# Patient Record
Sex: FEMALE | Race: BLACK OR AFRICAN AMERICAN | NOT HISPANIC OR LATINO | URBAN - METROPOLITAN AREA
[De-identification: names, ages, dates, MRNs, and addresses within clinical notes are randomized per-mention and may not be internally consistent; named-entity substitution may affect disease eponyms.]

---

## 2018-09-10 ENCOUNTER — EMERGENCY (EMERGENCY)
Facility: HOSPITAL | Age: 38
LOS: 1 days | Discharge: HOME | End: 2018-09-10
Attending: EMERGENCY MEDICINE
Payer: COMMERCIAL

## 2018-09-10 VITALS — HEART RATE: 95 BPM | OXYGEN SATURATION: 99 % | RESPIRATION RATE: 16 BRPM

## 2018-09-10 VITALS
DIASTOLIC BLOOD PRESSURE: 90 MMHG | TEMPERATURE: 98 F | OXYGEN SATURATION: 98 % | SYSTOLIC BLOOD PRESSURE: 130 MMHG | RESPIRATION RATE: 18 BRPM | WEIGHT: 149.91 LBS | HEIGHT: 65 IN | HEART RATE: 110 BPM

## 2018-09-10 LAB
ALBUMIN SERPL ELPH-MCNC: 3.6 G/DL — SIGNIFICANT CHANGE UP (ref 3.3–5)
ALP SERPL-CCNC: 60 U/L — SIGNIFICANT CHANGE UP (ref 40–120)
ALT FLD-CCNC: 70 U/L — HIGH (ref 10–45)
ANION GAP SERPL CALC-SCNC: 13 MMOL/L — SIGNIFICANT CHANGE UP (ref 5–17)
APTT BLD: 29.6 SEC — SIGNIFICANT CHANGE UP (ref 27.5–37.4)
AST SERPL-CCNC: 41 U/L — HIGH (ref 10–40)
BASOPHILS # BLD AUTO: 0 K/UL — SIGNIFICANT CHANGE UP (ref 0–0.2)
BASOPHILS NFR BLD AUTO: 0.8 % — SIGNIFICANT CHANGE UP (ref 0–2)
BILIRUB SERPL-MCNC: 0.2 MG/DL — SIGNIFICANT CHANGE UP (ref 0.2–1.2)
BUN SERPL-MCNC: 8 MG/DL — SIGNIFICANT CHANGE UP (ref 7–23)
CALCIUM SERPL-MCNC: 9.3 MG/DL — SIGNIFICANT CHANGE UP (ref 8.4–10.5)
CHLORIDE SERPL-SCNC: 104 MMOL/L — SIGNIFICANT CHANGE UP (ref 96–108)
CO2 SERPL-SCNC: 24 MMOL/L — SIGNIFICANT CHANGE UP (ref 22–31)
CREAT SERPL-MCNC: 0.62 MG/DL — SIGNIFICANT CHANGE UP (ref 0.5–1.3)
EOSINOPHIL # BLD AUTO: 0 K/UL — SIGNIFICANT CHANGE UP (ref 0–0.5)
EOSINOPHIL NFR BLD AUTO: 0.6 % — SIGNIFICANT CHANGE UP (ref 0–6)
GLUCOSE SERPL-MCNC: 114 MG/DL — HIGH (ref 70–99)
HCG SERPL-ACNC: <2 MIU/ML — SIGNIFICANT CHANGE UP
HCT VFR BLD CALC: 29.1 % — LOW (ref 34.5–45)
HGB BLD-MCNC: 9.5 G/DL — LOW (ref 11.5–15.5)
INR BLD: 1.02 RATIO — SIGNIFICANT CHANGE UP (ref 0.88–1.16)
LYMPHOCYTES # BLD AUTO: 1.6 K/UL — SIGNIFICANT CHANGE UP (ref 1–3.3)
LYMPHOCYTES # BLD AUTO: 30.1 % — SIGNIFICANT CHANGE UP (ref 13–44)
MCHC RBC-ENTMCNC: 28 PG — SIGNIFICANT CHANGE UP (ref 27–34)
MCHC RBC-ENTMCNC: 32.7 GM/DL — SIGNIFICANT CHANGE UP (ref 32–36)
MCV RBC AUTO: 85.6 FL — SIGNIFICANT CHANGE UP (ref 80–100)
MONOCYTES # BLD AUTO: 0.3 K/UL — SIGNIFICANT CHANGE UP (ref 0–0.9)
MONOCYTES NFR BLD AUTO: 6.1 % — SIGNIFICANT CHANGE UP (ref 2–14)
NEUTROPHILS # BLD AUTO: 3.4 K/UL — SIGNIFICANT CHANGE UP (ref 1.8–7.4)
NEUTROPHILS NFR BLD AUTO: 62.4 % — SIGNIFICANT CHANGE UP (ref 43–77)
PLATELET # BLD AUTO: 500 K/UL — HIGH (ref 150–400)
POTASSIUM SERPL-MCNC: 4 MMOL/L — SIGNIFICANT CHANGE UP (ref 3.5–5.3)
POTASSIUM SERPL-SCNC: 4 MMOL/L — SIGNIFICANT CHANGE UP (ref 3.5–5.3)
PROT SERPL-MCNC: 6.8 G/DL — SIGNIFICANT CHANGE UP (ref 6–8.3)
PROTHROM AB SERPL-ACNC: 11.1 SEC — SIGNIFICANT CHANGE UP (ref 9.8–12.7)
RBC # BLD: 3.4 M/UL — LOW (ref 3.8–5.2)
RBC # FLD: 16.2 % — HIGH (ref 10.3–14.5)
SODIUM SERPL-SCNC: 141 MMOL/L — SIGNIFICANT CHANGE UP (ref 135–145)
WBC # BLD: 5.5 K/UL — SIGNIFICANT CHANGE UP (ref 3.8–10.5)
WBC # FLD AUTO: 5.5 K/UL — SIGNIFICANT CHANGE UP (ref 3.8–10.5)

## 2018-09-10 PROCEDURE — 99284 EMERGENCY DEPT VISIT MOD MDM: CPT | Mod: 25

## 2018-09-10 PROCEDURE — 85610 PROTHROMBIN TIME: CPT

## 2018-09-10 PROCEDURE — 85027 COMPLETE CBC AUTOMATED: CPT

## 2018-09-10 PROCEDURE — 93970 EXTREMITY STUDY: CPT | Mod: 26

## 2018-09-10 PROCEDURE — 80053 COMPREHEN METABOLIC PANEL: CPT

## 2018-09-10 PROCEDURE — 93970 EXTREMITY STUDY: CPT

## 2018-09-10 PROCEDURE — 85730 THROMBOPLASTIN TIME PARTIAL: CPT

## 2018-09-10 PROCEDURE — 84702 CHORIONIC GONADOTROPIN TEST: CPT

## 2018-09-10 PROCEDURE — 99284 EMERGENCY DEPT VISIT MOD MDM: CPT

## 2018-09-10 NOTE — ED ADULT NURSE NOTE - NSIMPLEMENTINTERV_GEN_ALL_ED
Implemented All Universal Safety Interventions:  [place] to call system. Call bell, personal items and telephone within reach. Instruct patient to call for assistance. Room bathroom lighting operational. Non-slip footwear when patient is off stretcher. Physically safe environment: no spills, clutter or unnecessary equipment. Stretcher in lowest position, wheels locked, appropriate side rails in place.

## 2018-09-10 NOTE — ED ADULT TRIAGE NOTE - CHIEF COMPLAINT QUOTE
"I have calf pain for two days (Left leg). My doctor told me to come to rule DVT" s/p hernia surgery august 27th in [facility] [place]

## 2018-09-10 NOTE — ED PROVIDER NOTE - PROGRESS NOTE DETAILS
Received sign out from resident Ali pending duplex read. Duplex negative. MD made aware and cleared pt for discharge with PMD f/u. Pt made aware of elevated LFTs and need to f/u with PMD for repeat. Stable for d/c. Strict return precautions enforced. - Praneeth Khan PA-C

## 2018-09-10 NOTE — ED PROVIDER NOTE - OBJECTIVE STATEMENT
37 yo female presenting with 1 week of worsening bilateral leg swelling with development of left calf pain in the last two days.  Patient describes pain as burning and sharp, worse with walking, completely resolved with rest.  has not taken anything for her symptoms.  symptoms started after patient got back from [place] on sept[place] 4.  recently had surgery in the end of august and still has drain in place.

## 2018-09-10 NOTE — ED PROVIDER NOTE - MEDICAL DECISION MAKING DETAILS
39 yo female presenting with leg swelling s/p travel and surgery; will obtain labs ultrasound --> re eval

## 2018-09-10 NOTE — ED PROVIDER NOTE - ATTENDING CONTRIBUTION TO CARE
39 y/o F with left calf pain and bilateraly LE swelling (L>R) with recent surgery (hernia repair) and recent travel from the [place] (returned 6 days ago).  On exam, is well appearing in NAD, but nervous that she has blood clot (was instructed by urgent care to come to ED right away for evaluation).  Denies chest pain, shortness of breath or palpitations.      Lungs CTABL.  Cardiac nrl s1s2 RRR no mgr (initial tachycardia resolved shortly after beginning my exam).  Abdomen soft nt/nd.  LE: mild nonpitting peripheral pedal and calf edema b/l.  Mild tenderness to left calf along posterior/lateral aspect; no erythema, no warmth, no palpable induration or fluctuance or crepitance.  No rash.  No break in skin along legs noted.  DP and PT pulses intact 2+ b/l.      No signs of infectious etiology.  No signs of traumatic injury or reports of trauma.  Given recent surgery and travel, raises concern for DVT.  Will check labs including cbc, cmp, pt/inr and obtain b/l [facility] duplex ultrasounds of the lower extremities.  If no significant lab abnormalities and no evidence of DVT, may reflect only venous stasis changed, though would caution patient to have early and close f/u and to return if worsening pain, or signs of infection develop (redness, warmth to area, drainage, fever or other systemic symptoms).    Tachycardia that was initially present unlikely to represent PE unless DVT found on US.

## 2018-09-10 NOTE — ED PROVIDER NOTE - CARE PLAN
Principal Discharge DX:	Pain of left calf  Assessment and plan of treatment:	1. Follow up with your PMD in 1-2 days. You liver function tests were mildly elevated here. Please follow up with your PMD for repeat testing.   2. Rest, stay hydrated. Continue all your current home medications as previously prescribed.   3. Return to the ED immediately if you develop any new/worsening symptoms, chest pain, shortness of breath, palpitations, weakness, dizziness, worsening lower extremity swelling, calf pain, fever/chills or any other concerning symptoms.

## 2018-09-10 NOTE — ED ADULT NURSE NOTE - CHIEF COMPLAINT QUOTE
"I have calf pain for two days (Left leg). My doctor told me to come to rule DVT" s/p hernia surgery august 27th in [facility] [place]

## 2018-09-10 NOTE — ED PROVIDER NOTE - NS ED ROS FT
Constitutional: no fever  Eyes: no conjunctivitis  Ears: no ear pain   Nose: no nasal congestion, Mouth/Throat: no throat pain, Neck: no stiffness  Cardiovascular: no chest pain  Chest: no cough  Gastrointestinal: no abdominal pain, no vomiting and diarrhea  MSK: +leg pain +swelling   : no dysuria  Skin: no rash  Neuro: no LOC

## 2018-09-10 NOTE — ED PROVIDER NOTE - PHYSICAL EXAMINATION
gen: well appearing  Mentation: AAO x 3  psych: mood appropriate  ENT: airway patent  Eyes: conjunctivae clear bilaterally  Cardio: tachycardic, no m/r/g  Resp: normal BS b/l  GI: s/nt/nd   Neuro: AAO x 3, sensation and motor function intact, CN 2-12 intact  Skin: No evidence of rash  MSK: normal movement of all extremities, 2+ pitting edema bilaterally in lower extremities, tenderness over left calf

## 2018-09-10 NOTE — ED PROVIDER NOTE - PLAN OF CARE
1. Follow up with your PMD in 1-2 days. You liver function tests were mildly elevated here. Please follow up with your PMD for repeat testing.   2. Rest, stay hydrated. Continue all your current home medications as previously prescribed.   3. Return to the ED immediately if you develop any new/worsening symptoms, chest pain, shortness of breath, palpitations, weakness, dizziness, worsening lower extremity swelling, calf pain, fever/chills or any other concerning symptoms.

## 2019-01-22 ENCOUNTER — EMERGENCY (EMERGENCY)
Facility: HOSPITAL | Age: 39
LOS: 1 days | Discharge: HOME | End: 2019-01-22
Attending: EMERGENCY MEDICINE | Admitting: EMERGENCY MEDICINE
Payer: COMMERCIAL

## 2019-01-22 VITALS
RESPIRATION RATE: 18 BRPM | DIASTOLIC BLOOD PRESSURE: 98 MMHG | TEMPERATURE: 99 F | HEART RATE: 105 BPM | OXYGEN SATURATION: 100 % | SYSTOLIC BLOOD PRESSURE: 171 MMHG

## 2019-01-22 DIAGNOSIS — Z90.89 ACQUIRED ABSENCE OF OTHER ORGANS: Chronic | ICD-10-CM

## 2019-01-22 LAB
ANION GAP SERPL CALC-SCNC: 15 MMO/L — HIGH (ref 7–14)
BASOPHILS # BLD AUTO: 0.01 K/UL — SIGNIFICANT CHANGE UP (ref 0–0.2)
BASOPHILS NFR BLD AUTO: 0.1 % — SIGNIFICANT CHANGE UP (ref 0–2)
BUN SERPL-MCNC: 9 MG/DL — SIGNIFICANT CHANGE UP (ref 7–23)
CALCIUM SERPL-MCNC: 9.4 MG/DL — SIGNIFICANT CHANGE UP (ref 8.4–10.5)
CHLORIDE SERPL-SCNC: 100 MMOL/L — SIGNIFICANT CHANGE UP (ref 98–107)
CO2 SERPL-SCNC: 22 MMOL/L — SIGNIFICANT CHANGE UP (ref 22–31)
CREAT SERPL-MCNC: 0.68 MG/DL — SIGNIFICANT CHANGE UP (ref 0.5–1.3)
EOSINOPHIL # BLD AUTO: 0.01 K/UL — SIGNIFICANT CHANGE UP (ref 0–0.5)
EOSINOPHIL NFR BLD AUTO: 0.1 % — SIGNIFICANT CHANGE UP (ref 0–6)
FLU A RESULT: DETECTED — HIGH
FLU A RESULT: DETECTED — HIGH
FLUAV AG NPH QL: DETECTED — HIGH
FLUBV AG NPH QL: NOT DETECTED — SIGNIFICANT CHANGE UP
GLUCOSE SERPL-MCNC: 106 MG/DL — HIGH (ref 70–99)
HCT VFR BLD CALC: 41.4 % — SIGNIFICANT CHANGE UP (ref 34.5–45)
HGB BLD-MCNC: 12.7 G/DL — SIGNIFICANT CHANGE UP (ref 11.5–15.5)
IMM GRANULOCYTES NFR BLD AUTO: 0.6 % — SIGNIFICANT CHANGE UP (ref 0–1.5)
LYMPHOCYTES # BLD AUTO: 0.94 K/UL — LOW (ref 1–3.3)
LYMPHOCYTES # BLD AUTO: 13.3 % — SIGNIFICANT CHANGE UP (ref 13–44)
MCHC RBC-ENTMCNC: 24.2 PG — LOW (ref 27–34)
MCHC RBC-ENTMCNC: 30.7 % — LOW (ref 32–36)
MCV RBC AUTO: 79 FL — LOW (ref 80–100)
MONOCYTES # BLD AUTO: 0.54 K/UL — SIGNIFICANT CHANGE UP (ref 0–0.9)
MONOCYTES NFR BLD AUTO: 7.6 % — SIGNIFICANT CHANGE UP (ref 2–14)
NEUTROPHILS # BLD AUTO: 5.54 K/UL — SIGNIFICANT CHANGE UP (ref 1.8–7.4)
NEUTROPHILS NFR BLD AUTO: 78.3 % — HIGH (ref 43–77)
NRBC # FLD: 0 K/UL — LOW (ref 25–125)
PLATELET # BLD AUTO: 281 K/UL — SIGNIFICANT CHANGE UP (ref 150–400)
PMV BLD: 11.2 FL — SIGNIFICANT CHANGE UP (ref 7–13)
POTASSIUM SERPL-MCNC: 4.3 MMOL/L — SIGNIFICANT CHANGE UP (ref 3.5–5.3)
POTASSIUM SERPL-SCNC: 4.3 MMOL/L — SIGNIFICANT CHANGE UP (ref 3.5–5.3)
RBC # BLD: 5.24 M/UL — HIGH (ref 3.8–5.2)
RBC # FLD: 16.8 % — HIGH (ref 10.3–14.5)
RSV RESULT: SIGNIFICANT CHANGE UP
RSV RNA RESP QL NAA+PROBE: SIGNIFICANT CHANGE UP
SODIUM SERPL-SCNC: 137 MMOL/L — SIGNIFICANT CHANGE UP (ref 135–145)
WBC # BLD: 7.08 K/UL — SIGNIFICANT CHANGE UP (ref 3.8–10.5)
WBC # FLD AUTO: 7.08 K/UL — SIGNIFICANT CHANGE UP (ref 3.8–10.5)

## 2019-01-22 PROCEDURE — 71046 X-RAY EXAM CHEST 2 VIEWS: CPT | Mod: 26

## 2019-01-22 PROCEDURE — 99284 EMERGENCY DEPT VISIT MOD MDM: CPT

## 2019-01-22 RX ORDER — ACETAMINOPHEN 500 MG
650 TABLET ORAL ONCE
Qty: 0 | Refills: 0 | Status: COMPLETED | OUTPATIENT
Start: 2019-01-22 | End: 2019-01-22

## 2019-01-22 RX ORDER — SODIUM CHLORIDE 9 MG/ML
2000 INJECTION INTRAMUSCULAR; INTRAVENOUS; SUBCUTANEOUS ONCE
Qty: 0 | Refills: 0 | Status: COMPLETED | OUTPATIENT
Start: 2019-01-22 | End: 2019-01-22

## 2019-01-22 RX ORDER — KETOROLAC TROMETHAMINE 30 MG/ML
15 SYRINGE (ML) INJECTION ONCE
Qty: 0 | Refills: 0 | Status: DISCONTINUED | OUTPATIENT
Start: 2019-01-22 | End: 2019-01-22

## 2019-01-22 RX ORDER — IBUPROFEN 200 MG
1 TABLET ORAL
Qty: 30 | Refills: 0 | OUTPATIENT
Start: 2019-01-22

## 2019-01-22 RX ADMIN — Medication 650 MILLIGRAM(S): at 09:55

## 2019-01-22 RX ADMIN — SODIUM CHLORIDE 2000 MILLILITER(S): 9 INJECTION INTRAMUSCULAR; INTRAVENOUS; SUBCUTANEOUS at 09:56

## 2019-01-22 RX ADMIN — Medication 15 MILLIGRAM(S): at 09:55

## 2019-01-22 NOTE — ED ADULT TRIAGE NOTE - PAIN RATING/NUMBER SCALE (0-10): ACTIVITY
"Chief Complaint   Patient presents with     RECHECK     right foot fx MRI done last week        Initial /76  Ht 5' 5\" (1.651 m)  Wt 140 lb (63.5 kg)  LMP 08/20/2015  BMI 23.3 kg/m2 Estimated body mass index is 23.3 kg/(m^2) as calculated from the following:    Height as of this encounter: 5' 5\" (1.651 m).    Weight as of this encounter: 140 lb (63.5 kg).  Medication Reconciliation: complete   Emmanuel Lugo MA      "
5

## 2019-01-22 NOTE — ED PROVIDER NOTE - OBJECTIVE STATEMENT
37 y/o female with a PMHx of tonsillectomy presents to the ED c/o chills, myalgias, and cough since yesterday afternoon. 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 Received flu vaccination this year. No other acute complaints at time of eval.

## 2019-01-22 NOTE — ED PROVIDER NOTE - PLAN OF CARE
You were seen today for your cough.  You have influenza, "the flu".  Antibiotics are not helpful in this case.  Take acetaminophen for any pain or fever.  Drink plenty of fluids.  Wash your hands well!  Be sure to follow up with your primary care physician in 2-3 days for re-evaluation.  RETURN TO THE EMERGENCY DEPARTMENT IMMEDIATELY FOR SEVERE WEAKNESS, TROUBLE BREATHING, IF YOU CANNOT EAT OR DRINK, OR FOR ANY OTHER CONCERN.

## 2019-01-22 NOTE — ED PROVIDER NOTE - PMH
Complete Spontaneous     Fibroids    GBS (Group B Streptococcus) Infection    GDM, Class A2    History of LEEP (Loop Electrosurgical Excision Procedure) of Cervix Complicating Pregn    History of Tonsillectomy    PPD Positive  negative chest x-ray  TOP (Termination of Pregnancy)    TOP (Termination of Pregnancy)

## 2019-01-22 NOTE — ED PROVIDER NOTE - MEDICAL DECISION MAKING DETAILS
37 y/o female with flu-like symptoms. Obtain CBC, BNP, urine pregnancy, CXR, give 2L IV fluid, Toradol, aceto, and reassess. 37 y/o female with flu-like symptoms. Obtain CBC, BMP, urine pregnancy, CXR, give 2L IV fluid, Toradol, aceto, and reassess.

## 2019-01-22 NOTE — ED PROVIDER NOTE - CARE PLAN
Principal Discharge DX:	Flu-like symptoms  Assessment and plan of treatment:	You were seen today for your cough.  You have influenza, "the flu".  Antibiotics are not helpful in this case.  Take acetaminophen for any pain or fever.  Drink plenty of fluids.  Wash your hands well!  Be sure to follow up with your primary care physician in 2-3 days for re-evaluation.  RETURN TO THE EMERGENCY DEPARTMENT IMMEDIATELY FOR SEVERE WEAKNESS, TROUBLE BREATHING, IF YOU CANNOT EAT OR DRINK, OR FOR ANY OTHER CONCERN. Principal Discharge DX:	Flu  Assessment and plan of treatment:	You were seen today for your cough.  You have influenza, "the flu".  Antibiotics are not helpful in this case.  Take acetaminophen for any pain or fever.  Drink plenty of fluids.  Wash your hands well!  Be sure to follow up with your primary care physician in 2-3 days for re-evaluation.  RETURN TO THE EMERGENCY DEPARTMENT IMMEDIATELY FOR SEVERE WEAKNESS, TROUBLE BREATHING, IF YOU CANNOT EAT OR DRINK, OR FOR ANY OTHER CONCERN.

## 2019-02-18 ENCOUNTER — TRANSCRIPTION ENCOUNTER (OUTPATIENT)
Age: 39
End: 2019-02-18

## 2019-03-15 ENCOUNTER — TRANSCRIPTION ENCOUNTER (OUTPATIENT)
Age: 39
End: 2019-03-15

## 2019-06-27 ENCOUNTER — EMERGENCY (EMERGENCY)
Facility: HOSPITAL | Age: 39
LOS: 1 days | Discharge: HOME | End: 2019-06-27
Attending: EMERGENCY MEDICINE
Payer: COMMERCIAL

## 2019-06-27 VITALS
TEMPERATURE: 98 F | DIASTOLIC BLOOD PRESSURE: 88 MMHG | HEART RATE: 90 BPM | RESPIRATION RATE: 18 BRPM | WEIGHT: 164.91 LBS | HEIGHT: 65 IN | SYSTOLIC BLOOD PRESSURE: 153 MMHG | OXYGEN SATURATION: 100 %

## 2019-06-27 DIAGNOSIS — Z90.89 ACQUIRED ABSENCE OF OTHER ORGANS: Chronic | ICD-10-CM

## 2019-06-27 PROCEDURE — 99283 EMERGENCY DEPT VISIT LOW MDM: CPT | Mod: 25

## 2019-06-28 VITALS
OXYGEN SATURATION: 98 % | DIASTOLIC BLOOD PRESSURE: 105 MMHG | HEART RATE: 85 BPM | RESPIRATION RATE: 16 BRPM | TEMPERATURE: 98 F | SYSTOLIC BLOOD PRESSURE: 139 MMHG

## 2019-06-28 PROCEDURE — 99283 EMERGENCY DEPT VISIT LOW MDM: CPT

## 2019-06-28 RX ORDER — ACETAMINOPHEN 500 MG
975 TABLET ORAL ONCE
Refills: 0 | Status: COMPLETED | OUTPATIENT
Start: 2019-06-28 | End: 2019-06-28

## 2019-06-28 RX ORDER — IBUPROFEN 200 MG
600 TABLET ORAL ONCE
Refills: 0 | Status: COMPLETED | OUTPATIENT
Start: 2019-06-28 | End: 2019-06-28

## 2019-06-28 RX ADMIN — Medication 975 MILLIGRAM(S): at 00:36

## 2019-06-28 RX ADMIN — Medication 600 MILLIGRAM(S): at 00:36

## 2019-06-28 NOTE — ED PROVIDER NOTE - NS ED ROS FT
CONST: no fevers, no chills  EYES: no pain, no vision changes  ENT: no sore throat, no ear pain, no change in hearing  CV: no chest pain, no leg swelling  RESP: no shortness of breath, no cough  ABD: no abdominal pain, no nausea, no vomiting, no diarrhea  : no dysuria, no flank pain, no hematuria  MSK: no back pain, no extremity pain  NEURO: +headache, no additional neurologic complaints  HEME: no easy bleeding  SKIN:  no rash CONST: no fevers, no chills  EYES: no pain, no vision changes  ENT: no sore throat, no ear pain, no change in hearing  CV: no chest pain, no leg swelling  RESP: no shortness of breath, no cough  ABD: no abdominal pain, no nausea, no vomiting, no diarrhea  : no dysuria, no flank pain, no hematuria  MSK: no back pain, no extremity pain  NEURO: +headache, no additional neurologic complaints  HEME: no easy bleeding  SKIN:  no rash    All other systems negative

## 2019-06-28 NOTE — ED ADULT NURSE NOTE - OBJECTIVE STATEMENT
39 YOF A&Ox3 with pmh of DM (on metformin) presents to ED for high BP of 1 day. Patient states felt a headache that radiates to back of eyes when she decided to check her BP which was in 150s. Patient states has never been diagnosed with HTN. Patient rates headache 7/10. Patient denies sob, chest pain, n/v/d, dizziness, blurry vision at this time. patient states took home medication today. safety maintained.

## 2019-06-28 NOTE — ED PROVIDER NOTE - NS ED ATTENDING STATEMENT MOD
I have personally seen and examined this patient.  I have fully participated in the care of this patient. I have reviewed all pertinent clinical information, including history, physical exam, plan and the Resident’s note and agree except as noted.
Yes

## 2019-06-28 NOTE — ED PROVIDER NOTE - PHYSICAL EXAMINATION
Gloria Schmidt, .:   GENERAL: Patient awake alert NAD.  HEENT: NC/AT, Moist mucous membranes, PERRL, EOMI.  LUNGS: CTAB, no wheezes or crackles.   CARDIAC: RRR, no m/r/g.    ABDOMEN: Soft, NT, ND, No rebound, guarding. No CVA tenderness.   EXT: No edema. No calf tenderness.  MSK: No spinal tenderness, no pain with movement, no deformities.  NEURO: A&Ox3. Moving all extremities. CN2-12 grossly intact, no pronator drift, normal sensation and 5/5 strength in all extremities, normal gait.  SKIN: Warm and dry. No rash.  PSYCH: Normal affect.

## 2019-06-28 NOTE — ED PROVIDER NOTE - ATTENDING CONTRIBUTION TO CARE
Ellyn Medina MD - Attending Physician: I have personally seen and examined this patient with the resident/fellow.  I have fully participated in the care of this patient. I have reviewed all pertinent clinical information, including history, physical exam, plan and the Resident/Fellow’s note and agree except as noted. See MDM

## 2019-06-28 NOTE — ED ADULT NURSE NOTE - CHPI ED NUR SYMPTOMS NEG
no decreased eating/drinking/no chills/no fever/no dizziness/no pain/no nausea/no weakness/no vomiting/no tingling

## 2019-06-28 NOTE — ED PROVIDER NOTE - CLINICAL SUMMARY MEDICAL DECISION MAKING FREE TEXT BOX
39F p/w elevated BP. No signs of HTN urgency, no neuro deficits. Will pain control HA, reassess. dc home with PMD f/u. 39F p/w elevated BP. No signs of HTN urgency, no neuro deficits. Will pain control HA, reassess. dc home with PMD f/u.    Ellyn Medina MD - Attending Physician: Pt here with sporadic headache for the last 2 weeks. Noted elevated BP during headaches. No neuro deficits. Well appearing. Meds, f/u with PMD

## 2019-06-28 NOTE — ED PROVIDER NOTE - OBJECTIVE STATEMENT
39F pmhx of DM (well controlled) presents with elevated BP at home, 150s/100s. Noted to have bitemporal headache and neck pain. 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 Did not take any medications for headache. Denies any chest pain, SOB, N/V/D, vision change.

## 2019-06-28 NOTE — ED PROVIDER NOTE - NSFOLLOWUPINSTRUCTIONS_ED_ALL_ED_FT
Preventing Hypertension  Hypertension, commonly called high blood pressure, is when the force of blood pumping through the arteries is too strong. Arteries are blood vessels that carry blood from the heart throughout the body. Over time, hypertension can damage the arteries and decrease blood flow to important parts of the body, including the brain, heart, and kidneys. Often, hypertension does not cause symptoms until blood pressure is very high. For this reason, it is important to have your blood pressure checked on a regular basis.    Hypertension can often be prevented with diet and lifestyle changes. If you already have hypertension, you can control it with diet and lifestyle changes, as well as medicine.    What nutrition changes can be made?  ImageMaintain a healthy diet. This includes:  Eating less salt (sodium). Ask your health care provider how much sodium is safe for you to have. The general recommendation is to consume less than 1 tsp (2,300 mg) of sodium a day.  Do not add salt to your food.  Choose low-sodium options when grocery shopping and eating out.  Limiting fats in your diet. You can do this by eating low-fat or fat-free dairy products and by eating less red meat.  Eating more fruits, vegetables, and whole grains. Make a goal to eat:  1½–2 cups of fresh fruits and vegetables each day.  3–4 servings of whole grains each day.  Avoiding foods and beverages that have added sugars.  Eating fish that contain healthy fats (omega-3 fatty acids), such as mackerel or salmon.  If you need help putting together a healthy eating plan, try the DASH diet. This diet is high in fruits, vegetables, and whole grains. It is low in sodium, red meat, and added sugars. DASH stands for Dietary Approaches to Stop Hypertension.    What lifestyle changes can be made?  Image   Lose weight if you are overweight. Losing just 3?5% of your body weight can help prevent or control hypertension.  For example, if your present weight is 200 lb (91 kg), a loss of 3–5% of your weight means losing 6–10 lb (2.7–4.5 kg).  Ask your health care provider to help you with a diet and exercise plan to safely lose weight.  Get enough exercise. Do at least 150 minutes of moderate-intensity exercise each week.  You could do this in short exercise sessions several times a day, or you could do longer exercise sessions a few times a week. For example, you could take a brisk 10-minute walk or bike ride, 3 times a day, for 5 days a week.  Find ways to reduce stress, such as exercising, meditating, listening to music, or taking a yoga class. If you need help reducing stress, ask your health care provider.  Do not smoke. This includes e-cigarettes. Chemicals in tobacco and nicotine products raise your blood pressure each time you smoke. If you need help quitting, ask your health care provider.  Avoid alcohol. If you drink alcohol, limit alcohol intake to no more than 1 drink a day for nonpregnant women and 2 drinks a day for men. One drink equals 12 oz of beer, 5 oz of wine, or 1½ oz of hard liquor.  Why are these changes important?  Diet and lifestyle changes can help you prevent hypertension, and they may make you feel better overall and improve your quality of life. If you have hypertension, making these changes will help you control it and help prevent major complications, such as:  Hardening and narrowing of arteries that supply blood to:  Your heart. This can cause a heart attack.  Your brain. This can cause a stroke.  Your kidneys. This can cause kidney failure.  Stress on your heart muscle, which can cause heart failure.  What can I do to lower my risk?  Work with your health care provider to make a hypertension prevention plan that works for you. Follow your plan and keep all follow-up visits as told by your health care provider.  Learn how to check your blood pressure at home. Make sure that you know your personal target blood pressure, as told by your health care provider.  How is this treated?  In addition to diet and lifestyle changes, your health care provider may recommend medicines to help lower your blood pressure. You may need to try a few different medicines to find what works best for you. You also may need to take more than one medicine. Take over-the-counter and prescription medicines only as told by your health care provider.    Where to find support  Your health care provider can help you prevent hypertension and help you keep your blood pressure at a healthy level. Your local hospital or your community may also provide support services and prevention programs.    The American Heart Association offers an online support network at: http://supportnetwork.heart.org/high-blood-pressure    Where to find more information  Learn more about hypertension from:  National Heart, Lung, and Blood [place]: www.nhlbi.nih.gov/health/health-topics/topics/hbp  Centers for Disease Control and Prevention: www.cdc.gov/bloodpressure  American Academy of Family Physicians: http://familydoctor.org/familydoctor/en/diseases-conditions/high-blood-pressure.printerview.all.html  Learn more about the DASH diet from:  National Heart, Lung, and Blood [place]: www.nhlbi.nih.gov/health/health-topics/topics/dash  Contact a health care provider if:  You think you are having a reaction to medicines you have taken.  You have recurrent headaches or feel dizzy.  You have swelling in your ankles.  You have trouble with your vision.  Summary  Hypertension often does not cause any symptoms until blood pressure is very high. It is important to get your blood pressure checked regularly.  Diet and lifestyle changes are the most important steps in preventing hypertension.  By keeping your blood pressure in a healthy range, you can prevent complications like heart attack, heart failure, stroke, and kidney failure.  Work with your health care provider to make a hypertension prevention plan that works for you.  This information is not intended to replace advice given to you by your health care provider. Make sure you discuss any questions you have with your health care provider.

## 2020-02-25 ENCOUNTER — APPOINTMENT (OUTPATIENT)
Dept: GASTROENTEROLOGY | Facility: CLINIC | Age: 40
End: 2020-02-25
Payer: COMMERCIAL

## 2020-02-25 VITALS
WEIGHT: 156 LBS | HEIGHT: 66 IN | DIASTOLIC BLOOD PRESSURE: 80 MMHG | SYSTOLIC BLOOD PRESSURE: 130 MMHG | TEMPERATURE: 98.7 F | BODY MASS INDEX: 25.07 KG/M2 | OXYGEN SATURATION: 98 % | HEART RATE: 88 BPM

## 2020-02-25 DIAGNOSIS — Z72.89 OTHER PROBLEMS RELATED TO LIFESTYLE: ICD-10-CM

## 2020-02-25 DIAGNOSIS — Z78.9 OTHER SPECIFIED HEALTH STATUS: ICD-10-CM

## 2020-02-25 DIAGNOSIS — Z83.3 FAMILY HISTORY OF DIABETES MELLITUS: ICD-10-CM

## 2020-02-25 DIAGNOSIS — Z82.49 FAMILY HISTORY OF ISCHEMIC HEART DISEASE AND OTHER DISEASES OF THE CIRCULATORY SYSTEM: ICD-10-CM

## 2020-02-25 DIAGNOSIS — Z86.39 PERSONAL HISTORY OF OTHER ENDOCRINE, NUTRITIONAL AND METABOLIC DISEASE: ICD-10-CM

## 2020-02-25 DIAGNOSIS — Z86.79 PERSONAL HISTORY OF OTHER DISEASES OF THE CIRCULATORY SYSTEM: ICD-10-CM

## 2020-02-25 PROCEDURE — 99243 OFF/OP CNSLTJ NEW/EST LOW 30: CPT

## 2020-02-25 RX ORDER — METFORMIN HYDROCHLORIDE 625 MG/1
TABLET ORAL
Refills: 0 | Status: ACTIVE | COMMUNITY

## 2020-02-25 RX ORDER — LOSARTAN POTASSIUM 100 MG/1
TABLET, FILM COATED ORAL
Refills: 0 | Status: ACTIVE | COMMUNITY

## 2020-02-25 NOTE — CONSULT LETTER
[Consult Letter:] : I had the pleasure of evaluating your patient, [unfilled]. [Dear  ___] : Dear  [unfilled], [Consult Closing:] : Thank you very much for allowing me to participate in the care of this patient.  If you have any questions, please do not hesitate to contact me. [Please see my note below.] : Please see my note below. [Sincerely,] : Sincerely, [FreeTextEntry3] : Jason López MD, FACG\par

## 2020-02-25 NOTE — PHYSICAL EXAM
[General Appearance - Alert] : alert [General Appearance - In No Acute Distress] : in no acute distress [Sclera] : the sclera and conjunctiva were normal [PERRL With Normal Accommodation] : pupils were equal in size, round, and reactive to light [Extraocular Movements] : extraocular movements were intact [Oropharynx] : the oropharynx was normal [Outer Ear] : the ears and nose were normal in appearance [Neck Appearance] : the appearance of the neck was normal [Neck Cervical Mass (___cm)] : no neck mass was observed [Jugular Venous Distention Increased] : there was no jugular-venous distention [Thyroid Diffuse Enlargement] : the thyroid was not enlarged [Thyroid Nodule] : there were no palpable thyroid nodules [Auscultation Breath Sounds / Voice Sounds] : lungs were clear to auscultation bilaterally [Heart Rate And Rhythm] : heart rate was normal and rhythm regular [Heart Sounds] : normal S1 and S2 [Heart Sounds Gallop] : no gallops [Murmurs] : no murmurs [Heart Sounds Pericardial Friction Rub] : no pericardial rub [Bowel Sounds] : normal bowel sounds [Abdomen Soft] : soft [] : no hepato-splenomegaly [Abdomen Tenderness] : non-tender [Cervical Lymph Nodes Enlarged Posterior Bilaterally] : posterior cervical [Abdomen Mass (___ Cm)] : no abdominal mass palpated [Cervical Lymph Nodes Enlarged Anterior Bilaterally] : anterior cervical [Supraclavicular Lymph Nodes Enlarged Bilaterally] : supraclavicular [No CVA Tenderness] : no ~M costovertebral angle tenderness [No Spinal Tenderness] : no spinal tenderness [Abnormal Walk] : normal gait [Nail Clubbing] : no clubbing  or cyanosis of the fingernails [Musculoskeletal - Swelling] : no joint swelling seen [Motor Tone] : muscle strength and tone were normal [Sensation] : the sensory exam was normal to light touch and pinprick [Deep Tendon Reflexes (DTR)] : deep tendon reflexes were 2+ and symmetric [No Focal Deficits] : no focal deficits [Impaired Insight] : insight and judgment were intact [Affect] : the affect was normal [Oriented To Time, Place, And Person] : oriented to person, place, and time

## 2020-02-25 NOTE — HISTORY OF PRESENT ILLNESS
[Heartburn] : improved heartburn [Nausea] : denies nausea [Vomiting] : denies vomiting [Yellow Skin Or Eyes (Jaundice)] : denies jaundice [Abdominal Pain] : denies abdominal pain [Abdominal Swelling] : denies abdominal swelling [Rectal Pain] : denies rectal pain [Wt Gain ___ Lbs] : recent [unfilled] ~Upound(s) weight gain [Diarrhea] : diarrhea [Constipation] : constipation [Wt Loss ___ Lbs] : no recent weight loss [GERD] : no gastroesophageal reflux disease [Peptic Ulcer Disease] : no peptic ulcer disease [Hiatus Hernia] : no hiatus hernia [Pancreatitis] : no pancreatitis [Cholelithiasis] : no cholelithiasis [Kidney Stone] : no kidney stone [Inflammatory Bowel Disease] : no inflammatory bowel disease [Irritable Bowel Syndrome] : no irritable bowel syndrome [Diverticulitis] : no diverticulitis [Alcohol Abuse] : no alcohol abuse [Malignancy] : no malignancy [Abdominal Surgery] : no abdominal surgery [Appendectomy] : no appendectomy [Cholecystectomy] : no cholecystectomy [de-identified] : 39 year old woman has noticed a  change in her bowel movements for the past 3 - 4 months. She has constipation followed by soft n long bowel movements. She denies rectal bleeding, melena or hematemesis. She had been on high fiber diet but her PCP recommended eliminating the fiber supplements. Her only new medication is Losartan. She has never underdone a colonoscopy.. she is being treated for HTN and NIDDM.,

## 2020-04-06 ENCOUNTER — APPOINTMENT (OUTPATIENT)
Dept: GASTROENTEROLOGY | Facility: CLINIC | Age: 40
End: 2020-04-06
Payer: COMMERCIAL

## 2020-04-06 PROCEDURE — 99213 OFFICE O/P EST LOW 20 MIN: CPT

## 2020-04-06 NOTE — HISTORY OF PRESENT ILLNESS
[Medical Office: ([facility])___] : at ~his/her~ medical office located in [place] [Patient] : the patient [Self] : self [FreeTextEntry2] : Verbal consent given on     4/6           at    11:00         by     Rizwana Azevedo                  [de-identified] : 39 year old woman complains of constipation. She has been taking Dulcolax but is concerned about taking it. She denies rectal bleeding. She is due for a colonoscopy.

## 2020-04-06 NOTE — ASSESSMENT
[FreeTextEntry1] : Suggested she start on a high fiber diet and start Benefiber and MiraLAX. She will undergo a colonoscopy as soon as it is available.

## 2020-04-22 ENCOUNTER — APPOINTMENT (OUTPATIENT)
Dept: GASTROENTEROLOGY | Facility: AMBULATORY MEDICAL SERVICES | Age: 40
End: 2020-04-22

## 2020-07-11 DIAGNOSIS — Z01.818 ENCOUNTER FOR OTHER PREPROCEDURAL EXAMINATION: ICD-10-CM

## 2020-07-12 ENCOUNTER — APPOINTMENT (OUTPATIENT)
Dept: DISASTER EMERGENCY | Facility: CLINIC | Age: 40
End: 2020-07-12

## 2020-07-14 LAB — SARS-COV-2 N GENE NPH QL NAA+PROBE: NOT DETECTED

## 2020-07-15 ENCOUNTER — APPOINTMENT (OUTPATIENT)
Dept: GASTROENTEROLOGY | Facility: AMBULATORY MEDICAL SERVICES | Age: 40
End: 2020-07-15
Payer: COMMERCIAL

## 2020-07-15 PROCEDURE — 45378 DIAGNOSTIC COLONOSCOPY: CPT

## 2020-07-30 ENCOUNTER — APPOINTMENT (OUTPATIENT)
Dept: GASTROENTEROLOGY | Facility: CLINIC | Age: 40
End: 2020-07-30
Payer: COMMERCIAL

## 2020-07-30 VITALS
TEMPERATURE: 98.5 F | WEIGHT: 166 LBS | BODY MASS INDEX: 26.68 KG/M2 | OXYGEN SATURATION: 99 % | SYSTOLIC BLOOD PRESSURE: 115 MMHG | HEART RATE: 93 BPM | HEIGHT: 66 IN | RESPIRATION RATE: 17 BRPM | DIASTOLIC BLOOD PRESSURE: 80 MMHG

## 2020-07-30 DIAGNOSIS — R19.4 CHANGE IN BOWEL HABIT: ICD-10-CM

## 2020-07-30 PROCEDURE — 99213 OFFICE O/P EST LOW 20 MIN: CPT

## 2020-07-30 RX ORDER — POLYETHYLENE GLYCOL 3350, SODIUM CHLORIDE, SODIUM BICARBONATE AND POTASSIUM CHLORIDE WITH LEMON FLAVOR 420; 11.2; 5.72; 1.48 G/4L; G/4L; G/4L; G/4L
420 POWDER, FOR SOLUTION ORAL
Qty: 1 | Refills: 0 | Status: COMPLETED | COMMUNITY
Start: 2020-02-25 | End: 2020-07-30

## 2020-07-30 RX ORDER — LINACLOTIDE 290 UG/1
290 CAPSULE, GELATIN COATED ORAL
Qty: 30 | Refills: 2 | Status: DISCONTINUED | COMMUNITY
Start: 2020-07-30 | End: 2020-07-30

## 2020-07-30 NOTE — PHYSICAL EXAM
[General Appearance - Alert] : alert [General Appearance - In No Acute Distress] : in no acute distress [Sclera] : the sclera and conjunctiva were normal [PERRL With Normal Accommodation] : pupils were equal in size, round, and reactive to light [Extraocular Movements] : extraocular movements were intact [Outer Ear] : the ears and nose were normal in appearance [Oropharynx] : the oropharynx was normal [Neck Appearance] : the appearance of the neck was normal [Neck Cervical Mass (___cm)] : no neck mass was observed [Jugular Venous Distention Increased] : there was no jugular-venous distention [Thyroid Diffuse Enlargement] : the thyroid was not enlarged [Thyroid Nodule] : there were no palpable thyroid nodules [Auscultation Breath Sounds / Voice Sounds] : lungs were clear to auscultation bilaterally [Heart Rate And Rhythm] : heart rate was normal and rhythm regular [Heart Sounds] : normal S1 and S2 [Heart Sounds Gallop] : no gallops [Murmurs] : no murmurs [Heart Sounds Pericardial Friction Rub] : no pericardial rub [Bowel Sounds] : normal bowel sounds [Abdomen Soft] : soft [Abdomen Tenderness] : non-tender [] : no hepato-splenomegaly [Abdomen Mass (___ Cm)] : no abdominal mass palpated [Cervical Lymph Nodes Enlarged Posterior Bilaterally] : posterior cervical [Cervical Lymph Nodes Enlarged Anterior Bilaterally] : anterior cervical [Supraclavicular Lymph Nodes Enlarged Bilaterally] : supraclavicular [No CVA Tenderness] : no ~M costovertebral angle tenderness [No Spinal Tenderness] : no spinal tenderness

## 2020-07-30 NOTE — HISTORY OF PRESENT ILLNESS
[de-identified] : 40 year old woman with chronic constipation. She takes a fiber supplement but remains constipated. Colonoscopy on 7/15 was unremarkable. She denies rectal bleeding, melena or hematemesis.

## 2020-08-24 NOTE — ED ADULT NURSE NOTE - NSSUSCREENINGQ2_ED_ALL_ED
Full range of motion of upper and lower extremities, no joint tenderness/swelling. No

## 2021-01-11 NOTE — ED PROVIDER NOTE - NS_EDPROVIDERDISPOUSERTYPE_ED_A_ED
Your opinion matters!  Thank you for choosing the [facility].  You might receive a survey in the mail about your experience today to evaluate our clinic.  Please fill it out and return it in the pre-paid envelope.  It was a pleasure to care for you today!    If you any questions regarding your visit today or your discharge instructions call [phone].    
Attending Attestation (For Attendings USE Only)...

## 2021-11-01 ENCOUNTER — APPOINTMENT (OUTPATIENT)
Dept: GASTROENTEROLOGY | Facility: CLINIC | Age: 41
End: 2021-11-01
Payer: COMMERCIAL

## 2021-11-01 VITALS
HEIGHT: 66 IN | BODY MASS INDEX: 25.55 KG/M2 | OXYGEN SATURATION: 100 % | SYSTOLIC BLOOD PRESSURE: 143 MMHG | TEMPERATURE: 97.6 F | HEART RATE: 77 BPM | DIASTOLIC BLOOD PRESSURE: 89 MMHG | RESPIRATION RATE: 17 BRPM | WEIGHT: 159 LBS

## 2021-11-01 DIAGNOSIS — K59.00 CONSTIPATION, UNSPECIFIED: ICD-10-CM

## 2021-11-01 DIAGNOSIS — Z80.0 FAMILY HISTORY OF MALIGNANT NEOPLASM OF DIGESTIVE ORGANS: ICD-10-CM

## 2021-11-01 PROCEDURE — 99213 OFFICE O/P EST LOW 20 MIN: CPT

## 2021-11-01 RX ORDER — AMOXICILLIN 500 MG/1
500 CAPSULE ORAL
Qty: 21 | Refills: 0 | Status: COMPLETED | COMMUNITY
Start: 2021-06-30 | End: 2021-11-01

## 2021-11-01 NOTE — ASSESSMENT
[FreeTextEntry1] : Chronic constipation. Dietary and lifestyle modification discussed with the patient.  The patient will start Linzess.\par Colon cancer screening and a high risk patient discussed.  Will shorten the interval for her next screening colonoscopy.  FIT test ordered.\par

## 2021-11-01 NOTE — HISTORY OF PRESENT ILLNESS
[de-identified] : 41-year-old woman with chronic constipation.  She was previously prescribed Linzess but never filled the prescription.  She is on a high-fiber diet and has had some improvement but the constipation remains.  She is concerned because her father was recently diagnosed with colon cancer.  Previous colonoscopy in July 2020 was unremarkable.  We will schedule her for a colonoscopy in 3 years.

## 2021-11-09 LAB — HEMOCCULT STL QL IA: NEGATIVE

## 2023-01-28 ENCOUNTER — NON-APPOINTMENT (OUTPATIENT)
Age: 43
End: 2023-01-28

## 2023-02-24 ENCOUNTER — NON-APPOINTMENT (OUTPATIENT)
Age: 43
End: 2023-02-24

## 2023-03-07 ENCOUNTER — NON-APPOINTMENT (OUTPATIENT)
Age: 43
End: 2023-03-07

## 2023-03-09 ENCOUNTER — APPOINTMENT (OUTPATIENT)
Dept: GASTROENTEROLOGY | Facility: CLINIC | Age: 43
End: 2023-03-09
Payer: COMMERCIAL

## 2023-03-09 VITALS
OXYGEN SATURATION: 98 % | WEIGHT: 158 LBS | DIASTOLIC BLOOD PRESSURE: 85 MMHG | TEMPERATURE: 97.8 F | HEIGHT: 65 IN | SYSTOLIC BLOOD PRESSURE: 126 MMHG | HEART RATE: 90 BPM | BODY MASS INDEX: 26.33 KG/M2

## 2023-03-09 DIAGNOSIS — Z12.11 ENCOUNTER FOR SCREENING FOR MALIGNANT NEOPLASM OF COLON: ICD-10-CM

## 2023-03-09 DIAGNOSIS — K58.9 IRRITABLE BOWEL SYNDROME W/OUT DIARRHEA: ICD-10-CM

## 2023-03-09 DIAGNOSIS — Z12.12 ENCOUNTER FOR SCREENING FOR MALIGNANT NEOPLASM OF COLON: ICD-10-CM

## 2023-03-09 DIAGNOSIS — K42.9 UMBILICAL HERNIA W/OUT OBSTRUCTION OR GANGRENE: ICD-10-CM

## 2023-03-09 DIAGNOSIS — Z80.9 FAMILY HISTORY OF MALIGNANT NEOPLASM, UNSPECIFIED: ICD-10-CM

## 2023-03-09 PROCEDURE — 99214 OFFICE O/P EST MOD 30 MIN: CPT

## 2023-03-09 RX ORDER — POLYETHYLENE GLYCOL 3350 17 G/17G
17 POWDER, FOR SOLUTION ORAL DAILY
Qty: 1 | Refills: 0 | Status: DISCONTINUED | COMMUNITY
Start: 2020-04-06 | End: 2023-03-09

## 2023-03-09 RX ORDER — LABETALOL HYDROCHLORIDE 100 MG/1
100 TABLET, FILM COATED ORAL
Qty: 360 | Refills: 0 | Status: DISCONTINUED | COMMUNITY
Start: 2021-04-26 | End: 2023-03-09

## 2023-03-09 RX ORDER — LINACLOTIDE 145 UG/1
145 CAPSULE, GELATIN COATED ORAL
Qty: 30 | Refills: 3 | Status: DISCONTINUED | COMMUNITY
Start: 2020-07-30 | End: 2023-03-09

## 2023-03-09 RX ORDER — WHEAT DEXTRIN 3 G/4 G
POWDER (GRAM) ORAL
Qty: 1 | Refills: 1 | Status: DISCONTINUED | COMMUNITY
Start: 2020-04-06 | End: 2023-03-09

## 2023-03-09 RX ORDER — IBUPROFEN 600 MG/1
600 TABLET, FILM COATED ORAL
Qty: 20 | Refills: 0 | Status: DISCONTINUED | COMMUNITY
Start: 2021-06-30 | End: 2023-03-09

## 2023-03-09 RX ORDER — FLUCONAZOLE 150 MG/1
150 TABLET ORAL
Qty: 1 | Refills: 0 | Status: DISCONTINUED | COMMUNITY
Start: 2021-07-06 | End: 2023-03-09

## 2023-03-09 NOTE — ASSESSMENT
[FreeTextEntry1] : Screening colonoscopy in this high-risk patient with a family history of metastatic colon cancer.  The importance of colon cancer screening and the colonoscopy prep was discussed with the patient.  She understands and all questions were answered.

## 2023-03-09 NOTE — HISTORY OF PRESENT ILLNESS
[FreeTextEntry1] : 42-year-old woman with diabetes mellitus and hypertension with a family history of colon cancer.  Father diagnosed with colon cancer recently found to have pulmonary mets.  She returns for a follow-up screening colonoscopy.  Last colonoscopy was approximately 3 years ago.  She continues to complain of chronic constipation.  She denies rectal bleeding, melena or hematemesis.  She has no reflux symptoms.

## 2023-03-09 NOTE — PHYSICAL EXAM
[Heart Rate And Rhythm] : heart rate was normal and rhythm regular [None] : no edema [Cervical Lymph Nodes Enlarged Posterior Bilaterally] : no posterior cervical lymphadenopathy [Supraclavicular Lymph Nodes Enlarged Bilaterally] : no supraclavicular lymphadenopathy [Normal] : oriented to person, place, and time [Normal Mood] : the mood was normal

## 2023-03-21 ENCOUNTER — APPOINTMENT (OUTPATIENT)
Dept: GASTROENTEROLOGY | Facility: CLINIC | Age: 43
End: 2023-03-21

## 2023-04-21 NOTE — ED PROVIDER NOTE - TEMPLATE
Sarita Vazquez MD FACS  Progress Note     LOS: 2 days   Patient Care Team:  Sachin Dia MD as PCP - General  Sachin Dia MD as PCP - Family Medicine  Sumeet Bowles PA as Physician Assistant (Family Medicine)      Subjective     Chief complaint:  cellulitis    History of Present Illness:     Feeling better.  newton po.  Ambulated in room      The following portions of the patient's history were reviewed and updated as appropriate: allergies, current medications, past family history, past medical history, past social history, past surgical history, and problem list.    Objective     Physical Exam:    Vital Signs:  Temp:  [97.4 °F (36.3 °C)-98.9 °F (37.2 °C)] 98.9 °F (37.2 °C)  Heart Rate:  [57-78] 62  Resp:  [18] 18  BP: (118-153)/(43-63) 124/56      Constitutional:    Well-developed, well-nourished in no acute distress  Eyes:     Extraocular movements intact; pupils equal, round, and reactive  Ears, Nose, Mouth, Throat:  Hearing intact, nose midline, no mucosal lesions  Cardiovascular:   Regular rate and rhythm   Respiratory:    Clear to auscultation bilaterally  Gastrointestinal:   Soft, nontender, nondistended  Genitourinary:    Deferred  Musculoskeletal:   Full range of motion, no muscle wasting, no weakness  Skin:     Much decreased erythema, wounds clean  Neurological:    Moves all extremities, sensation intact  Psychiatric:    Alert and oriented to person, place, and time  Hematologic/Lymphatic/Immune: No lymphadenopathy      Results Review:    Lab Results (last 24 hours)     Procedure Component Value Units Date/Time    Vancomycin, Trough [[id number]]  (Normal) Collected: 04/20/23 0447    Specimen: Blood Updated: 04/20/23 0522     Vancomycin Trough 11.70 mcg/mL         Imaging Results (Last 24 Hours)     ** No results found for the last 24 hours. **            Assessment & Plan     Cellulitis s/p laceration repair    Continue iv abx and dressing changes      Sarita Vazquez  Communicable/Infectious MD  04/20/23  19:37 CDT

## 2023-08-03 NOTE — ED ADULT TRIAGE NOTE - HEIGHT IN INCHES
5
[FreeTextEntry1] : wellness complete labs today HTN controlled HLD check today check hep panel f/up annually mora - cpap

## 2023-12-05 NOTE — ED ADULT TRIAGE NOTE - NS ED NURSE BANDS TYPE
Normocephalic and atraumatic. Right Ear: Hearing, tympanic membrane, ear canal and external ear normal.      Left Ear: Hearing, tympanic membrane, ear canal and external ear normal.      Nose: No nasal deformity, laceration, mucosal edema, congestion or rhinorrhea. Right Sinus: No maxillary sinus tenderness or frontal sinus tenderness. Left Sinus: No maxillary sinus tenderness or frontal sinus tenderness. Mouth/Throat:      Mouth: Mucous membranes are moist.      Pharynx: Uvula midline. No oropharyngeal exudate. Eyes:      General: Lids are normal.         Right eye: No discharge. Left eye: No discharge. Conjunctiva/sclera: Conjunctivae normal.      Pupils: Pupils are equal, round, and reactive to light. Neck:      Thyroid: No thyroid mass or thyromegaly. Vascular: Normal carotid pulses. No carotid bruit, hepatojugular reflux or JVD. Trachea: Trachea and phonation normal.   Cardiovascular:      Rate and Rhythm: Normal rate and regular rhythm. Chest Wall: PMI is not displaced. Pulses: Normal pulses. Carotid pulses are 2+ on the right side and 2+ on the left side. Radial pulses are 2+ on the right side and 2+ on the left side. Heart sounds: Normal heart sounds, S1 normal and S2 normal.   Pulmonary:      Effort: Pulmonary effort is normal. No respiratory distress. Breath sounds: Normal breath sounds. No decreased breath sounds, wheezing or rhonchi. Abdominal:      General: Bowel sounds are normal. There is no distension. Palpations: Abdomen is soft. There is no mass. Tenderness: There is no abdominal tenderness. There is no guarding or rebound. Comments: No HSM   Musculoskeletal:         General: Normal range of motion. Right shoulder: Normal.      Left shoulder: Normal.      Cervical back: Full passive range of motion without pain, normal range of motion and neck supple.       Right hip: Normal.      Left hip:
Name band;

## 2023-12-22 ENCOUNTER — APPOINTMENT (OUTPATIENT)
Dept: ORTHOPEDIC SURGERY | Facility: CLINIC | Age: 43
End: 2023-12-22
Payer: COMMERCIAL

## 2023-12-22 DIAGNOSIS — M75.82 OTHER SHOULDER LESIONS, LEFT SHOULDER: ICD-10-CM

## 2023-12-22 DIAGNOSIS — M75.02 ADHESIVE CAPSULITIS OF LEFT SHOULDER: ICD-10-CM

## 2023-12-22 DIAGNOSIS — M25.512 PAIN IN LEFT SHOULDER: ICD-10-CM

## 2023-12-22 PROCEDURE — 99203 OFFICE O/P NEW LOW 30 MIN: CPT

## 2023-12-22 PROCEDURE — 73030 X-RAY EXAM OF SHOULDER: CPT | Mod: LT

## 2023-12-22 RX ORDER — MELOXICAM 15 MG/1
15 TABLET ORAL DAILY
Qty: 10 | Refills: 1 | Status: ACTIVE | COMMUNITY
Start: 2023-12-22 | End: 1900-01-01

## 2023-12-22 NOTE — HISTORY OF PRESENT ILLNESS
[de-identified] : BAIRON BRADEN  is a 43 year old RHD F who presents with left shoulder pain.  She says that it started about 2 weeks ago.  She denies any specific injury or incident to cause it but says that she may have slept on it in a wrong way.  She localizes the pain to the anterior aspect of the shoulder.  She denies any radiation of the pain down her arm into her fingers.  She says that hurts with certain movements such as reaching behind her back.  She has not really noticed a significant restriction in range of motion but because the pain does not move it fully in certain directions.  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  She says the pain did worsen over the past few days which caused her to seek attention.  She has not had any treatment for it as of yet.  Of note she is diabetic and recently her A1c was higher than it typically is.

## 2023-12-22 NOTE — DISCUSSION/SUMMARY
[de-identified] : Discussed findings of today's exam and possible causes of patient's pain.  Educated patient on their most probable diagnosis of left frozen shoulder versus rotator cuff tendinitis.  We discussed that frozen shoulder can occur in diabetic patients and is an inflammation of the shoulder capsule that can lead to restricted range of motion and pain with range of motion.  She could also have rotator cuff tendinitis, less likely rotator cuff tear as she did not have a specific injury and does have good range of motion and strength.  Reviewed possible courses of treatment, and we collaboratively decided best course of treatment at this time will include a course of physical therapy to aggressively restore normal range of motion at this time.  We will hold off on injection as her A1c has recently been high.  I will prescribe her meloxicam 15 mg daily to be taken with food for the next 10 days to help her with the pain. I discussed with them that they should not take this while also taking Aleve (Naprosyn), Motrin/ Advil (Ibuprofen), Toradol (ketoralac). They must stop taking it if they develops stomach pain, increased bleeding or bruising and they should follow-up with their primary care doctor for routine blood work including kidney function to monitor its effect. While it Is not a habit-forming substance, it should only be taken as needed and to discontinue use once symptoms have resolved.  She should follow-up after physical therapy in 6 to 8 weeks.  I have personally obtained the history, reviewed the ROS as noted, and performed the physical examination today. The patient and I discussed the assessment and options and developed the plan. All questions were answered and the patient stated their understanding of the treatment plan and appreciation of the visit.  My cumulative time spent on this patient's visit included: Preparation for the visit, review of the medical records, review of pertinent diagnostic studies, examination and counseling of the patient on the above diagnosis, treatment plan and prognosis, orders of diagnostic tests, medications and/or appropriate procedures and documentation in the medical records of today's visit.  This note was generated using dragon medical dictation software.  A reasonable effort has been made for proofreading its contents, but typos may still remain.  If there are any questions or points of clarification needed please notify my office.

## 2023-12-22 NOTE — PHYSICAL EXAM
[de-identified] : General: No apparent distress Cardiovascular: extremities warm and well-perfused, no cyanosis Pulmonary: non labored respirations  Musculoskeletal:  Cervical Spine: No Tenderness to palpation over the cervical spine in the midline or at the paraspinal musculature Full range of motion without pain Negative Spurling test Negative L'Hermitte test Motor: 5/5 deltoid/biceps/triceps/wrist extensors/wrist flexors/interossei Sensory: SILT in C5/C6/C7/C8/T1 distributions  Right shoulder: Skin: intact, no erythema or warmth, no open wounds Active ROM and passive range of motion: 170 forward flexion/90 external rotation/internal rotation to the thoracic spine Motor and sensory intact, fingers warm and well-perfused Tenderness: None RC strength: 5/5  Left shoulder: Skin: intact, no erythema or warmth, no open wounds Active ROM: 160 forward flexion/70 external rotation/internal rotation to the lumbar spine Motor and sensory intact, fingers warm and well-perfused Tenderness: Negative greater tuberosity, positive biceps tendon, negative shoulder joint line, negative AC joint RC strength: 4+/5 supraspinatus, 5/5 infraspinatus, 5/5 subscapularis, 5/5 teres minor Neer: Positive Pena: Positive Aaron's: Positive Belly Press: Negative [de-identified] : 4 views of the left shoulder obtained today and interpreted by me including Grashey AP, external rotation AP, axillary lateral, zanca view demonstrate no acute fracture or dislocation.